# Patient Record
Sex: MALE | Race: WHITE | NOT HISPANIC OR LATINO | URBAN - METROPOLITAN AREA
[De-identification: names, ages, dates, MRNs, and addresses within clinical notes are randomized per-mention and may not be internally consistent; named-entity substitution may affect disease eponyms.]

---

## 2018-06-26 ENCOUNTER — OFFICE VISIT CONVERTED (OUTPATIENT)
Dept: UROLOGY | Facility: CLINIC | Age: 73
End: 2018-06-26
Attending: UROLOGY

## 2018-06-26 ENCOUNTER — CONVERSION ENCOUNTER (OUTPATIENT)
Dept: UROLOGY | Facility: CLINIC | Age: 73
End: 2018-06-26

## 2019-01-04 ENCOUNTER — OFFICE VISIT CONVERTED (OUTPATIENT)
Dept: UROLOGY | Facility: CLINIC | Age: 74
End: 2019-01-04
Attending: UROLOGY

## 2019-04-24 ENCOUNTER — HOSPITAL ENCOUNTER (OUTPATIENT)
Dept: PERIOP | Facility: HOSPITAL | Age: 74
Setting detail: HOSPITAL OUTPATIENT SURGERY
Discharge: HOME | End: 2019-04-24
Attending: SURGERY

## 2019-05-07 ENCOUNTER — OFFICE VISIT CONVERTED (OUTPATIENT)
Dept: SURGERY | Facility: CLINIC | Age: 74
End: 2019-05-07
Attending: SURGERY

## 2019-07-12 ENCOUNTER — CONVERSION ENCOUNTER (OUTPATIENT)
Dept: UROLOGY | Facility: CLINIC | Age: 74
End: 2019-07-12

## 2019-07-12 ENCOUNTER — OFFICE VISIT CONVERTED (OUTPATIENT)
Dept: UROLOGY | Facility: CLINIC | Age: 74
End: 2019-07-12
Attending: UROLOGY

## 2019-08-06 ENCOUNTER — HOSPITAL ENCOUNTER (OUTPATIENT)
Dept: SURGERY | Facility: HOSPITAL | Age: 74
Setting detail: HOSPITAL OUTPATIENT SURGERY
Discharge: HOME | End: 2019-08-06
Attending: UROLOGY

## 2020-02-14 ENCOUNTER — HOSPITAL ENCOUNTER (OUTPATIENT)
Dept: UROLOGY | Facility: CLINIC | Age: 75
Discharge: HOME | End: 2020-02-14
Attending: UROLOGY

## 2020-02-14 ENCOUNTER — OFFICE VISIT CONVERTED (OUTPATIENT)
Dept: UROLOGY | Facility: CLINIC | Age: 75
End: 2020-02-14
Attending: UROLOGY

## 2020-02-14 LAB — PSA SERPL-MCNC: 0.81 NG/ML (ref 0–4)

## 2021-03-03 ENCOUNTER — HOSPITAL ENCOUNTER (OUTPATIENT)
Dept: VACCINE CLINIC | Facility: HOSPITAL | Age: 76
Discharge: HOME | End: 2021-03-03
Attending: INTERNAL MEDICINE

## 2021-03-24 ENCOUNTER — HOSPITAL ENCOUNTER (OUTPATIENT)
Dept: VACCINE CLINIC | Facility: HOSPITAL | Age: 76
Discharge: HOME | End: 2021-03-24
Attending: INTERNAL MEDICINE

## 2021-05-15 VITALS
SYSTOLIC BLOOD PRESSURE: 167 MMHG | HEIGHT: 73 IN | DIASTOLIC BLOOD PRESSURE: 76 MMHG | BODY MASS INDEX: 23.86 KG/M2 | WEIGHT: 180 LBS | HEART RATE: 78 BPM | TEMPERATURE: 98.7 F

## 2021-05-15 VITALS — RESPIRATION RATE: 16 BRPM | WEIGHT: 188 LBS | BODY MASS INDEX: 24.92 KG/M2 | HEIGHT: 73 IN

## 2021-05-15 VITALS
TEMPERATURE: 98.9 F | DIASTOLIC BLOOD PRESSURE: 75 MMHG | HEART RATE: 77 BPM | SYSTOLIC BLOOD PRESSURE: 166 MMHG | HEIGHT: 73 IN | WEIGHT: 180 LBS | BODY MASS INDEX: 23.86 KG/M2

## 2021-05-16 VITALS
TEMPERATURE: 98.2 F | DIASTOLIC BLOOD PRESSURE: 79 MMHG | WEIGHT: 186 LBS | SYSTOLIC BLOOD PRESSURE: 180 MMHG | HEART RATE: 71 BPM | BODY MASS INDEX: 24.65 KG/M2 | HEIGHT: 73 IN

## 2021-05-16 VITALS
TEMPERATURE: 98.3 F | HEART RATE: 69 BPM | BODY MASS INDEX: 24.78 KG/M2 | WEIGHT: 187 LBS | SYSTOLIC BLOOD PRESSURE: 155 MMHG | HEIGHT: 73 IN | DIASTOLIC BLOOD PRESSURE: 74 MMHG

## 2025-02-25 ENCOUNTER — OFFICE VISIT (OUTPATIENT)
Dept: INTERNAL MEDICINE | Age: 80
End: 2025-02-25
Payer: MEDICARE

## 2025-02-25 VITALS
HEART RATE: 93 BPM | SYSTOLIC BLOOD PRESSURE: 186 MMHG | HEIGHT: 73 IN | WEIGHT: 178 LBS | DIASTOLIC BLOOD PRESSURE: 79 MMHG | TEMPERATURE: 98 F | BODY MASS INDEX: 23.59 KG/M2 | OXYGEN SATURATION: 97 %

## 2025-02-25 DIAGNOSIS — R09.89 BILATERAL CAROTID BRUITS: ICD-10-CM

## 2025-02-25 DIAGNOSIS — Z12.5 SCREENING PSA (PROSTATE SPECIFIC ANTIGEN): ICD-10-CM

## 2025-02-25 DIAGNOSIS — I10 HYPERTENSION, ESSENTIAL: Primary | ICD-10-CM

## 2025-02-25 DIAGNOSIS — Z00.00 ENCOUNTER FOR ANNUAL PHYSICAL EXAM: ICD-10-CM

## 2025-02-25 DIAGNOSIS — E55.9 VITAMIN D DEFICIENCY: ICD-10-CM

## 2025-02-25 DIAGNOSIS — C67.9 MALIGNANT NEOPLASM OF URINARY BLADDER, UNSPECIFIED SITE: ICD-10-CM

## 2025-02-25 DIAGNOSIS — E04.1 THYROID NODULE: ICD-10-CM

## 2025-02-25 PROCEDURE — 99214 OFFICE O/P EST MOD 30 MIN: CPT | Performed by: INTERNAL MEDICINE

## 2025-02-25 PROCEDURE — 3078F DIAST BP <80 MM HG: CPT | Performed by: INTERNAL MEDICINE

## 2025-02-25 PROCEDURE — 3077F SYST BP >= 140 MM HG: CPT | Performed by: INTERNAL MEDICINE

## 2025-02-25 RX ORDER — AMLODIPINE AND BENAZEPRIL HYDROCHLORIDE 5; 10 MG/1; MG/1
1 CAPSULE ORAL DAILY
Qty: 30 CAPSULE | Refills: 5 | Status: SHIPPED | OUTPATIENT
Start: 2025-02-25 | End: 2025-02-25

## 2025-02-25 RX ORDER — AMLODIPINE BESYLATE 5 MG/1
5 TABLET ORAL DAILY
Qty: 30 TABLET | Refills: 5 | Status: SHIPPED | OUTPATIENT
Start: 2025-02-25

## 2025-02-25 NOTE — PROGRESS NOTES
"Chief Complaint   Patient presents with    Establish Care     New pt establish care. Pt is non fasting, has some concerns about BP running high.     Concerned re bp elevation, home numbers noted     Objective   Vital Signs  Vitals:    02/25/25 0933   BP: (!) 186/79   BP Location: Right arm   Patient Position: Sitting   Pulse: 93   Temp: 98 °F (36.7 °C)   SpO2: 97%   Weight: 80.7 kg (178 lb)   Height: 185.4 cm (72.99\")      Body mass index is 23.49 kg/m².  Review of Systems   Constitutional: Negative.    HENT: Negative.     Eyes: Negative.    Respiratory: Negative.     Cardiovascular: Negative.    Gastrointestinal: Negative.    Endocrine: Negative.    Genitourinary: Negative.    Musculoskeletal: Negative.    Allergic/Immunologic: Negative.    Neurological: Negative.    Hematological: Negative.    Psychiatric/Behavioral: Negative.        Physical Exam  Constitutional:       General: He is not in acute distress.     Appearance: Normal appearance.   HENT:      Head: Normocephalic.      Mouth/Throat:      Mouth: Mucous membranes are moist.   Eyes:      Conjunctiva/sclera: Conjunctivae normal.      Pupils: Pupils are equal, round, and reactive to light.   Cardiovascular:      Rate and Rhythm: Normal rate and regular rhythm.      Pulses: Normal pulses.      Heart sounds: Normal heart sounds.   Pulmonary:      Effort: Pulmonary effort is normal.      Breath sounds: Normal breath sounds.   Abdominal:      General: Abdomen is flat. Bowel sounds are normal.      Palpations: Abdomen is soft.   Musculoskeletal:         General: No swelling. Normal range of motion.      Cervical back: Neck supple.   Skin:     General: Skin is warm and dry.      Coloration: Skin is not jaundiced.   Neurological:      General: No focal deficit present.      Mental Status: He is alert and oriented to person, place, and time. Mental status is at baseline.   Psychiatric:         Mood and Affect: Mood normal.         Behavior: Behavior normal.         " "Thought Content: Thought content normal.         Judgment: Judgment normal.     Bilateral carotid bruits  Right thyroid fullness on exam with swallowing,  Abnormal right tympanic membrane  Partial left cerumen impactions  Result Review :   No results found for: \"PROBNP\", \"BNP\"          No results found for: \"TSH\"   No results found for: \"FREET4\"                       Visit Diagnoses:    ICD-10-CM ICD-9-CM   1. Hypertension, essential  I10 401.9   2. Encounter for annual physical exam  Z00.00 V70.0   3. Screening PSA (prostate specific antigen)  Z12.5 V76.44   4. Vitamin D deficiency  E55.9 268.9   5. Malignant neoplasm of urinary bladder, unspecified site  C67.9 188.9   6. Bilateral carotid bruits  R09.89 785.9   7. Thyroid nodule  E04.1 241.0       Assessment and Plan   Diagnoses and all orders for this visit:    1. Hypertension, essential (Primary)  -     CBC & Differential; Future  -     Comprehensive Metabolic Panel; Future  -     Lipid Panel; Future  -     Vitamin D,25-Hydroxy; Future  -     Vitamin B12 anemia; Future  -     Folate anemia; Future  -     TSH+Free T4; Future  -     PSA Screen; Future    2. Encounter for annual physical exam  -     CBC & Differential; Future  -     Comprehensive Metabolic Panel; Future  -     Lipid Panel; Future  -     Vitamin D,25-Hydroxy; Future  -     Vitamin B12 anemia; Future  -     Folate anemia; Future  -     TSH+Free T4; Future  -     PSA Screen; Future    3. Screening PSA (prostate specific antigen)  -     CBC & Differential; Future  -     Comprehensive Metabolic Panel; Future  -     Lipid Panel; Future  -     Vitamin D,25-Hydroxy; Future  -     Vitamin B12 anemia; Future  -     Folate anemia; Future  -     TSH+Free T4; Future  -     PSA Screen; Future    4. Vitamin D deficiency  -     CBC & Differential; Future  -     Comprehensive Metabolic Panel; Future  -     Lipid Panel; Future  -     Vitamin D,25-Hydroxy; Future  -     Vitamin B12 anemia; Future  -     Folate " anemia; Future  -     TSH+Free T4; Future  -     PSA Screen; Future    5. Malignant neoplasm of urinary bladder, unspecified site  -     CBC & Differential; Future  -     Comprehensive Metabolic Panel; Future  -     Lipid Panel; Future  -     Vitamin D,25-Hydroxy; Future  -     Vitamin B12 anemia; Future  -     Folate anemia; Future  -     TSH+Free T4; Future  -     PSA Screen; Future    6. Bilateral carotid bruits  -     Duplex Carotid Ultrasound CAR; Future    7. Thyroid nodule  -     US Thyroid; Future    Other orders  -     Discontinue: amLODIPine-benazepril (Lotrel) 5-10 MG per capsule; Take 1 capsule by mouth Daily.  Dispense: 30 capsule; Refill: 5  -     amLODIPine (Norvasc) 5 MG tablet; Take 1 tablet by mouth Daily.  Dispense: 30 tablet; Refill: 5        BMI is within normal parameters. No other follow-up for BMI required.     Htn --rec rx with lotrel 5/10 mg qhs feb 2025, watch sodium, cont exercise     Carotid bruits bilateral we will do carotid ultrasound,, February 2025    Right thyroid enlargement versus nodule will do thyroid ultrasound February 2025    Remote tobacco, quit age 41    Bladder ca --dr santiago, cystos, 2008     Bph --    Laparoscopic hernia repair Dr. Martinez    Abnormal right tympanic membrane,?  Left partial cerumen    Follow Up   Return in about 4 weeks (around 3/25/2025).  Patient was given instructions and counseling regarding his condition or for health maintenance advice. Please see specific information pulled into the AVS if appropriate.

## 2025-02-28 ENCOUNTER — PATIENT ROUNDING (BHMG ONLY) (OUTPATIENT)
Dept: INTERNAL MEDICINE | Age: 80
End: 2025-02-28
Payer: MEDICARE

## 2025-02-28 ENCOUNTER — HOSPITAL ENCOUNTER (OUTPATIENT)
Dept: ULTRASOUND IMAGING | Facility: HOSPITAL | Age: 80
Discharge: HOME | End: 2025-02-28
Payer: MEDICARE

## 2025-02-28 DIAGNOSIS — E04.1 THYROID NODULE: ICD-10-CM

## 2025-02-28 PROCEDURE — 76536 US EXAM OF HEAD AND NECK: CPT

## 2025-03-05 ENCOUNTER — TELEPHONE (OUTPATIENT)
Dept: INTERNAL MEDICINE | Age: 80
End: 2025-03-05
Payer: MEDICARE

## 2025-03-05 NOTE — TELEPHONE ENCOUNTER
"Relay     \"there was no suspicious thyroid nodules and will discuss further at his next appointment \"                "

## 2025-03-05 NOTE — TELEPHONE ENCOUNTER
HUB may relay message to patient.     ----- Message from Benji Oliver sent at 3/4/2025  4:44 PM EST -----  Call patient, there was no suspicious thyroid nodules and will discuss further at his next appointment

## 2025-03-05 NOTE — TELEPHONE ENCOUNTER
"Name: Claudio Leigh \"Al\"    Relationship: Self    Best Callback Number: [phone]     HUB PROVIDED THE RELAY MESSAGE FROM THE OFFICE   PATIENT VOICED UNDERSTANDING AND HAS NO FURTHER QUESTIONS AT THIS TIME  "

## 2025-03-17 ENCOUNTER — LAB (OUTPATIENT)
Dept: INTERNAL MEDICINE | Age: 80
End: 2025-03-17
Payer: MEDICARE

## 2025-03-17 DIAGNOSIS — I10 HYPERTENSION, ESSENTIAL: ICD-10-CM

## 2025-03-17 DIAGNOSIS — E55.9 VITAMIN D DEFICIENCY: ICD-10-CM

## 2025-03-17 DIAGNOSIS — Z00.00 ENCOUNTER FOR ANNUAL PHYSICAL EXAM: ICD-10-CM

## 2025-03-17 DIAGNOSIS — Z12.5 SCREENING PSA (PROSTATE SPECIFIC ANTIGEN): ICD-10-CM

## 2025-03-17 DIAGNOSIS — C67.9 MALIGNANT NEOPLASM OF URINARY BLADDER, UNSPECIFIED SITE: ICD-10-CM

## 2025-03-17 LAB
25(OH)D3 SERPL-MCNC: 34 NG/ML (ref 30–100)
ALBUMIN SERPL-MCNC: 4.3 G/DL (ref 3.5–5.2)
ALBUMIN/GLOB SERPL: 1.2 G/DL
ALP SERPL-CCNC: 86 U/L (ref 39–117)
ALT SERPL W P-5'-P-CCNC: 17 U/L (ref 1–41)
ANION GAP SERPL CALCULATED.3IONS-SCNC: 10.8 MMOL/L (ref 5–15)
AST SERPL-CCNC: 26 U/L (ref 1–40)
BASOPHILS # BLD AUTO: 0.06 10*3/MM3 (ref 0–0.2)
BASOPHILS NFR BLD AUTO: 0.9 % (ref 0–1.5)
BILIRUB SERPL-MCNC: 0.5 MG/DL (ref 0–1.2)
BUN SERPL-MCNC: 14 MG/DL (ref 8–23)
BUN/CREAT SERPL: 11.6 (ref 7–25)
CALCIUM SPEC-SCNC: 10 MG/DL (ref 8.6–10.5)
CHLORIDE SERPL-SCNC: 105 MMOL/L (ref 98–107)
CHOLEST SERPL-MCNC: 199 MG/DL (ref 0–200)
CO2 SERPL-SCNC: 26.2 MMOL/L (ref 22–29)
CREAT SERPL-MCNC: 1.21 MG/DL (ref 0.76–1.27)
DEPRECATED RDW RBC AUTO: 45.1 FL (ref 37–54)
EGFRCR SERPLBLD CKD-EPI 2021: 61.3 ML/MIN/1.73
EOSINOPHIL # BLD AUTO: 0.34 10*3/MM3 (ref 0–0.4)
EOSINOPHIL NFR BLD AUTO: 5 % (ref 0.3–6.2)
ERYTHROCYTE [DISTWIDTH] IN BLOOD BY AUTOMATED COUNT: 12.8 % (ref 12.3–15.4)
FOLATE SERPL-MCNC: 16.4 NG/ML (ref 4.78–24.2)
GLOBULIN UR ELPH-MCNC: 3.5 GM/DL
GLUCOSE SERPL-MCNC: 107 MG/DL (ref 65–99)
HCT VFR BLD AUTO: 45.9 % (ref 37.5–51)
HDLC SERPL-MCNC: 47 MG/DL (ref 40–60)
HGB BLD-MCNC: 14.8 G/DL (ref 13–17.7)
IMM GRANULOCYTES # BLD AUTO: 0.01 10*3/MM3 (ref 0–0.05)
IMM GRANULOCYTES NFR BLD AUTO: 0.1 % (ref 0–0.5)
LDLC SERPL CALC-MCNC: 133 MG/DL (ref 0–100)
LDLC/HDLC SERPL: 2.77 {RATIO}
LYMPHOCYTES # BLD AUTO: 1.7 10*3/MM3 (ref 0.7–3.1)
LYMPHOCYTES NFR BLD AUTO: 25.1 % (ref 19.6–45.3)
MCH RBC QN AUTO: 30.6 PG (ref 26.6–33)
MCHC RBC AUTO-ENTMCNC: 32.2 G/DL (ref 31.5–35.7)
MCV RBC AUTO: 94.8 FL (ref 79–97)
MONOCYTES # BLD AUTO: 0.74 10*3/MM3 (ref 0.1–0.9)
MONOCYTES NFR BLD AUTO: 10.9 % (ref 5–12)
NEUTROPHILS NFR BLD AUTO: 3.93 10*3/MM3 (ref 1.7–7)
NEUTROPHILS NFR BLD AUTO: 58 % (ref 42.7–76)
NRBC BLD AUTO-RTO: 0 /100 WBC (ref 0–0.2)
PLATELET # BLD AUTO: 223 10*3/MM3 (ref 140–450)
PMV BLD AUTO: 10.6 FL (ref 6–12)
POTASSIUM SERPL-SCNC: 5.1 MMOL/L (ref 3.5–5.2)
PROT SERPL-MCNC: 7.8 G/DL (ref 6–8.5)
PSA SERPL-MCNC: 0.77 NG/ML (ref 0–4)
RBC # BLD AUTO: 4.84 10*6/MM3 (ref 4.14–5.8)
SODIUM SERPL-SCNC: 142 MMOL/L (ref 136–145)
T4 FREE SERPL-MCNC: 0.83 NG/DL (ref 0.92–1.68)
TRIGL SERPL-MCNC: 108 MG/DL (ref 0–150)
TSH SERPL DL<=0.05 MIU/L-ACNC: 5.92 UIU/ML (ref 0.27–4.2)
VIT B12 BLD-MCNC: >2000 PG/ML (ref 211–946)
VLDLC SERPL-MCNC: 19 MG/DL (ref 5–40)
WBC NRBC COR # BLD AUTO: 6.78 10*3/MM3 (ref 3.4–10.8)

## 2025-03-17 PROCEDURE — 80061 LIPID PANEL: CPT | Performed by: INTERNAL MEDICINE

## 2025-03-17 PROCEDURE — 84443 ASSAY THYROID STIM HORMONE: CPT | Performed by: INTERNAL MEDICINE

## 2025-03-17 PROCEDURE — 82607 VITAMIN B-12: CPT | Performed by: INTERNAL MEDICINE

## 2025-03-17 PROCEDURE — 85025 COMPLETE CBC W/AUTO DIFF WBC: CPT | Performed by: INTERNAL MEDICINE

## 2025-03-17 PROCEDURE — 82306 VITAMIN D 25 HYDROXY: CPT | Performed by: INTERNAL MEDICINE

## 2025-03-17 PROCEDURE — 80053 COMPREHEN METABOLIC PANEL: CPT | Performed by: INTERNAL MEDICINE

## 2025-03-17 PROCEDURE — 36415 COLL VENOUS BLD VENIPUNCTURE: CPT | Performed by: INTERNAL MEDICINE

## 2025-03-17 PROCEDURE — 82746 ASSAY OF FOLIC ACID SERUM: CPT | Performed by: INTERNAL MEDICINE

## 2025-03-17 PROCEDURE — 84439 ASSAY OF FREE THYROXINE: CPT | Performed by: INTERNAL MEDICINE

## 2025-03-17 PROCEDURE — G0103 PSA SCREENING: HCPCS | Performed by: INTERNAL MEDICINE

## 2025-03-20 ENCOUNTER — OFFICE VISIT (OUTPATIENT)
Dept: INTERNAL MEDICINE | Age: 80
End: 2025-03-20
Payer: MEDICARE

## 2025-03-20 VITALS
OXYGEN SATURATION: 98 % | WEIGHT: 178 LBS | BODY MASS INDEX: 23.59 KG/M2 | SYSTOLIC BLOOD PRESSURE: 155 MMHG | DIASTOLIC BLOOD PRESSURE: 80 MMHG | TEMPERATURE: 98.2 F | HEART RATE: 71 BPM | HEIGHT: 73 IN

## 2025-03-20 DIAGNOSIS — Z12.5 SCREENING PSA (PROSTATE SPECIFIC ANTIGEN): ICD-10-CM

## 2025-03-20 DIAGNOSIS — E55.9 VITAMIN D DEFICIENCY: ICD-10-CM

## 2025-03-20 DIAGNOSIS — Z00.00 MEDICARE ANNUAL WELLNESS VISIT, SUBSEQUENT: Primary | ICD-10-CM

## 2025-03-20 DIAGNOSIS — E04.1 THYROID NODULE: ICD-10-CM

## 2025-03-20 DIAGNOSIS — R09.89 BILATERAL CAROTID BRUITS: ICD-10-CM

## 2025-03-20 DIAGNOSIS — R79.89 ELEVATED TSH: ICD-10-CM

## 2025-03-20 DIAGNOSIS — C67.9 MALIGNANT NEOPLASM OF URINARY BLADDER, UNSPECIFIED SITE: ICD-10-CM

## 2025-03-20 DIAGNOSIS — I10 HYPERTENSION, ESSENTIAL: ICD-10-CM

## 2025-03-20 NOTE — PROGRESS NOTES
Subjective   The ABCs of the Annual Wellness Visit  Medicare Wellness Visit      Claudio Leigh is a 79 y.o. patient who presents for a Medicare Wellness Visit.    The following portions of the patient's history were reviewed and   updated as appropriate: allergies, current medications, past family history, past medical history, past social history, past surgical history, and problem list.    Compared to one year ago, the patient's physical   health is better.  Compared to one year ago, the patient's mental   health is better.    Recent Hospitalizations:  He was not admitted to the hospital during the last year.     Current Medical Providers:  Patient Care Team:  Benji Oliver MD as PCP - General (Internal Medicine)    Outpatient Medications Prior to Visit   Medication Sig Dispense Refill    amLODIPine (Norvasc) 5 MG tablet Take 1 tablet by mouth Daily. 30 tablet 5    aspirin 81 MG EC tablet aspirin 81 mg oral tablet,delayed release (DR/EC) take 1 tablet (81 mg) by oral route once daily   Active      multivitamin with minerals (MULTIVITAMIN ADULT PO) multivitamin oral tablet take 1 tablet by oral route daily   Suspended       No facility-administered medications prior to visit.     No opioid medication identified on active medication list. I have reviewed chart for other potential  high risk medication/s and harmful drug interactions in the elderly.      Aspirin is on active medication list. Aspirin use is indicated based on review of current medical condition/s. Pros and cons of this therapy have been discussed today. Benefits of this medication outweigh potential harm.  Patient has been encouraged to continue taking this medication.  .      Patient Active Problem List   Diagnosis    Hypertension, essential    Encounter for annual physical exam    Vitamin D deficiency    Screening PSA (prostate specific antigen)    Malignant neoplasm of urinary bladder    Thyroid nodule    Bilateral carotid bruits  "    Advance Care Planning Advance Directive is on file.  ACP discussion was held with the patient during this visit. Patient has an advance directive in EMR which is still valid.             Objective   Vitals:    25 1309   BP: 155/80   BP Location: Left arm   Patient Position: Sitting   Pulse: 71   Temp: 98.2 °F (36.8 °C)   SpO2: 98%   Weight: 80.7 kg (178 lb)   Height: 185.4 cm (72.99\")   PainSc: 0-No pain       Estimated body mass index is 23.49 kg/m² as calculated from the following:    Height as of this encounter: 185.4 cm (72.99\").    Weight as of this encounter: 80.7 kg (178 lb).    BMI is within normal parameters. No other follow-up for BMI required.           Does the patient have evidence of cognitive impairment? No  Lab Results   Component Value Date    TRIG 108 2025    HDL 47 2025     (H) 2025    VLDL 19 2025                                                                                               Health  Risk Assessment    Smoking Status:  Social History     Tobacco Use   Smoking Status Former    Current packs/day: 0.00    Average packs/day: 1.5 packs/day for 25.0 years (37.5 ttl pk-yrs)    Types: Cigarettes    Start date:     Quit date:     Years since quittin.2   Smokeless Tobacco Never     Alcohol Consumption:  Social History     Substance and Sexual Activity   Alcohol Use Never       Fall Risk Screen  STEADI Fall Risk Assessment was completed, and patient is at LOW risk for falls.Assessment completed on:3/20/2025    Depression Screening   Little interest or pleasure in doing things? Not at all   Feeling down, depressed, or hopeless? Not at all   PHQ-2 Total Score 0      Health Habits and Functional and Cognitive Screening:      3/20/2025     1:00 PM   Functional & Cognitive Status   Do you have difficulty preparing food and eating? No   Do you have difficulty bathing yourself, getting dressed or grooming yourself? No   Do you have difficulty " using the toilet? No   Do you have difficulty moving around from place to place? No   Do you have trouble with steps or getting out of a bed or a chair? No   Current Diet Well Balanced Diet   Dental Exam Up to date   Eye Exam Up to date   Exercise (times per week) 3 times per week   Current Exercises Include Home Exercise Program (TV, Computer, Etc.);Walking   Do you need help using the phone?  No   Are you deaf or do you have serious difficulty hearing?  No   Do you need help to go to places out of walking distance? No   Do you need help shopping? No   Do you need help preparing meals?  No   Do you need help with housework?  No   Do you need help with laundry? No   Do you need help taking your medications? No   Do you need help managing money? No   Do you ever drive or ride in a car without wearing a seat belt? No   Have you felt unusual stress, anger or loneliness in the last month? No   Who do you live with? Spouse   If you need help, do you have trouble finding someone available to you? No   Have you been bothered in the last four weeks by sexual problems? No   Do you have difficulty concentrating, remembering or making decisions? No           Age-appropriate Screening Schedule:  Refer to the list below for future screening recommendations based on patient's age, sex and/or medical conditions. Orders for these recommended tests are listed in the plan section. The patient has been provided with a written plan.    Health Maintenance List  Health Maintenance   Topic Date Due    TDAP/TD VACCINES (1 - Tdap) Never done    Pneumococcal Vaccine 50+ (1 of 1 - PCV) Never done    ZOSTER VACCINE (1 of 2) Never done    RSV Vaccine - Adults (1 - 1-dose 75+ series) Never done    INFLUENZA VACCINE  Never done    HEPATITIS C SCREENING  Never done    ANNUAL WELLNESS VISIT  Never done    COVID-19 Vaccine (8 - 2024-25 season) 07/27/2025                                                                                                                                                 CMS Preventative Services Quick Reference  Risk Factors Identified During Encounter  None Identified    The above risks/problems have been discussed with the patient.  Pertinent information has been shared with the patient in the After Visit Summary.  An After Visit Summary and PPPS were made available to the patient.    Follow Up:   Next Medicare Wellness visit to be scheduled in 1 year.     Assessment & Plan         Follow Up:   No follow-ups on file.        Answers submitted by the patient for this visit:  Questionnaire about: Other medical problem (Submitted on 3/20/2025)  Chief Complaint: Other medical problem

## 2025-03-20 NOTE — PROGRESS NOTES
"Chief Complaint   Patient presents with    Medicare Wellness-subsequent     Wellness visit, lab follow up. HTN /BP log follow up. Pt would like to discuss PSA  and why he has a deficiency in Vitamin D.         Objective   Vital Signs  Vitals:    03/20/25 1309   BP: 155/80   BP Location: Left arm   Patient Position: Sitting   Pulse: 71   Temp: 98.2 °F (36.8 °C)   SpO2: 98%   Weight: 80.7 kg (178 lb)   Height: 185.4 cm (72.99\")      Body mass index is 23.49 kg/m².  Review of Systems   Constitutional: Negative.    HENT: Negative.     Eyes: Negative.    Respiratory: Negative.     Cardiovascular: Negative.    Gastrointestinal: Negative.    Endocrine: Negative.    Genitourinary: Negative.    Musculoskeletal: Negative.    Allergic/Immunologic: Negative.    Neurological: Negative.    Hematological: Negative.    Psychiatric/Behavioral: Negative.        Physical Exam  Constitutional:       General: He is not in acute distress.     Appearance: Normal appearance.   HENT:      Head: Normocephalic.      Mouth/Throat:      Mouth: Mucous membranes are moist.   Eyes:      Conjunctiva/sclera: Conjunctivae normal.      Pupils: Pupils are equal, round, and reactive to light.   Cardiovascular:      Rate and Rhythm: Normal rate and regular rhythm.      Pulses: Normal pulses.      Heart sounds: Normal heart sounds.   Pulmonary:      Effort: Pulmonary effort is normal.      Breath sounds: Normal breath sounds.   Abdominal:      General: Abdomen is flat. Bowel sounds are normal.      Palpations: Abdomen is soft.   Musculoskeletal:         General: No swelling. Normal range of motion.      Cervical back: Neck supple.   Skin:     General: Skin is warm and dry.      Coloration: Skin is not jaundiced.   Neurological:      General: No focal deficit present.      Mental Status: He is alert and oriented to person, place, and time. Mental status is at baseline.   Psychiatric:         Mood and Affect: Mood normal.         Behavior: Behavior normal.  " "       Thought Content: Thought content normal.         Judgment: Judgment normal.     Left carotid bruit  Result Review :   No results found for: \"PROBNP\", \"BNP\"  CMP          3/17/2025    09:52   CMP   Glucose 107    BUN 14    Creatinine 1.21    EGFR 61.3    Sodium 142    Potassium 5.1    Chloride 105    Calcium 10.0    Total Protein 7.8    Albumin 4.3    Globulin 3.5    Total Bilirubin 0.5    Alkaline Phosphatase 86    AST (SGOT) 26    ALT (SGPT) 17    Albumin/Globulin Ratio 1.2    BUN/Creatinine Ratio 11.6    Anion Gap 10.8      CBC w/diff          3/17/2025    09:52   CBC w/Diff   WBC 6.78    RBC 4.84    Hemoglobin 14.8    Hematocrit 45.9    MCV 94.8    MCH 30.6    MCHC 32.2    RDW 12.8    Platelets 223    Neutrophil Rel % 58.0    Immature Granulocyte Rel % 0.1    Lymphocyte Rel % 25.1    Monocyte Rel % 10.9    Eosinophil Rel % 5.0    Basophil Rel % 0.9       Lipid Panel          3/17/2025    09:52   Lipid Panel   Total Cholesterol 199    Triglycerides 108    HDL Cholesterol 47    VLDL Cholesterol 19    LDL Cholesterol  133    LDL/HDL Ratio 2.77       Lab Results   Component Value Date    TSH 5.920 (H) 03/17/2025      Lab Results   Component Value Date    FREET4 0.83 (L) 03/17/2025         PSA          3/17/2025    09:52   PSA   PSA 0.765                     Visit Diagnoses:    ICD-10-CM ICD-9-CM   1. Medicare annual wellness visit, subsequent  Z00.00 V70.0   2. Malignant neoplasm of urinary bladder, unspecified site  C67.9 188.9   3. Vitamin D deficiency  E55.9 268.9   4. Bilateral carotid bruits  R09.89 785.9   5. Hypertension, essential  I10 401.9   6. Thyroid nodule  E04.1 241.0   7. Screening PSA (prostate specific antigen)  Z12.5 V76.44   8. Elevated TSH  R79.89 794.5       Assessment and Plan   Diagnoses and all orders for this visit:    1. Medicare annual wellness visit, subsequent (Primary)  -     Comprehensive Metabolic Panel; Future  -     TSH+Free T4; Future  -     RPR Qualitative with Reflex to " Quant; Future    2. Malignant neoplasm of urinary bladder, unspecified site  -     Comprehensive Metabolic Panel; Future  -     TSH+Free T4; Future  -     RPR Qualitative with Reflex to Quant; Future    3. Vitamin D deficiency  -     Comprehensive Metabolic Panel; Future  -     TSH+Free T4; Future  -     RPR Qualitative with Reflex to Quant; Future    4. Bilateral carotid bruits  -     Comprehensive Metabolic Panel; Future  -     TSH+Free T4; Future  -     RPR Qualitative with Reflex to Quant; Future    5. Hypertension, essential  -     Comprehensive Metabolic Panel; Future  -     TSH+Free T4; Future  -     RPR Qualitative with Reflex to Quant; Future    6. Thyroid nodule  -     Comprehensive Metabolic Panel; Future  -     TSH+Free T4; Future  -     RPR Qualitative with Reflex to Quant; Future    7. Screening PSA (prostate specific antigen)  -     Comprehensive Metabolic Panel; Future  -     TSH+Free T4; Future  -     RPR Qualitative with Reflex to Quant; Future    8. Elevated TSH        Htn --continues amlodipine 5 mg nightly, home blood pressures reviewed most of them are in the 1 12-1 30 systolic range, occasionally high at dentist and doctors offices,    Carotid bruits bilateral ===carotid ultrasound,, February 2025--- schedule for a carotid ultrasound May 5, 2025 discussed briefly about statin usage carotid artery plaque buildup etc.,    Right thyroid enlargement versus nodule will do thyroid ultrasound February 2025--- results noted,===== no special thyroid nodules,  thyroid parenchyma mildly heterogeneous -----currently thyroid function tests atherosclerosis left carotid artery noted, discussed March 20, 2025,    Remote tobacco, quit age 41    Abnormal screening test for syphilis on a [place] blood donation screening test, will do VDRL RPR testing at next visit, September October November 2025,    Elevated TSH low free T4 diagnosis discussed with patient, subclinical hypothyroidism treatment options  discussed March 2025    Bladder ca --dr santiago, cystos, 2008     Bph --, PSA 0.7 stable March 2025    Laparoscopic hernia repair Dr. Martinez    Abnormal right tympanic membrane,?  Left partial cerumen        Follow Up   Return in about 6 months (around 9/20/2025).  Patient was given instructions and counseling regarding his condition or for health maintenance advice. Please see specific information pulled into the AVS if appropriate.           Answers submitted by the patient for this visit:  Questionnaire about: Other medical problem (Submitted on 3/20/2025)  Chief Complaint: Other medical problem

## 2025-05-05 ENCOUNTER — HOSPITAL ENCOUNTER (OUTPATIENT)
Dept: CARDIOLOGY | Facility: HOSPITAL | Age: 80
Discharge: HOME | End: 2025-05-05
Admitting: INTERNAL MEDICINE
Payer: MEDICARE

## 2025-05-05 DIAGNOSIS — R09.89 BILATERAL CAROTID BRUITS: ICD-10-CM

## 2025-05-05 LAB
BH CV XLRA MEAS LEFT CAROTID BULB EDV: 13.7 CM/SEC
BH CV XLRA MEAS LEFT CAROTID BULB PSV: 53.7 CM/SEC
BH CV XLRA MEAS LEFT DIST CCA EDV: 14.9 CM/SEC
BH CV XLRA MEAS LEFT DIST CCA PSV: 74.9 CM/SEC
BH CV XLRA MEAS LEFT DIST ICA EDV: 26.1 CM/SEC
BH CV XLRA MEAS LEFT DIST ICA PSV: 72 CM/SEC
BH CV XLRA MEAS LEFT ICA/CCA RATIO: 1.06
BH CV XLRA MEAS LEFT MID ICA EDV: 24.8 CM/SEC
BH CV XLRA MEAS LEFT MID ICA PSV: 79.5 CM/SEC
BH CV XLRA MEAS LEFT PROX CCA EDV: 16.6 CM/SEC
BH CV XLRA MEAS LEFT PROX CCA PSV: 77.8 CM/SEC
BH CV XLRA MEAS LEFT PROX ECA EDV: 13.7 CM/SEC
BH CV XLRA MEAS LEFT PROX ECA PSV: 126 CM/SEC
BH CV XLRA MEAS LEFT PROX ICA EDV: 23 CM/SEC
BH CV XLRA MEAS LEFT PROX ICA PSV: 64.6 CM/SEC
BH CV XLRA MEAS LEFT VERTEBRAL A EDV: 7.9 CM/SEC
BH CV XLRA MEAS LEFT VERTEBRAL A PSV: 27.5 CM/SEC
BH CV XLRA MEAS RIGHT CAROTID BULB EDV: 9.7 CM/SEC
BH CV XLRA MEAS RIGHT CAROTID BULB PSV: 52.9 CM/SEC
BH CV XLRA MEAS RIGHT DIST CCA EDV: 9.2 CM/SEC
BH CV XLRA MEAS RIGHT DIST CCA PSV: 59.5 CM/SEC
BH CV XLRA MEAS RIGHT DIST ICA EDV: 22.1 CM/SEC
BH CV XLRA MEAS RIGHT DIST ICA PSV: 77.5 CM/SEC
BH CV XLRA MEAS RIGHT ICA/CCA RATIO: 1.3
BH CV XLRA MEAS RIGHT MID ICA EDV: 18.5 CM/SEC
BH CV XLRA MEAS RIGHT MID ICA PSV: 72.1 CM/SEC
BH CV XLRA MEAS RIGHT PROX CCA EDV: 12.7 CM/SEC
BH CV XLRA MEAS RIGHT PROX CCA PSV: 57.8 CM/SEC
BH CV XLRA MEAS RIGHT PROX ECA EDV: 11 CM/SEC
BH CV XLRA MEAS RIGHT PROX ECA PSV: 114.9 CM/SEC
BH CV XLRA MEAS RIGHT PROX ICA EDV: 16.2 CM/SEC
BH CV XLRA MEAS RIGHT PROX ICA PSV: 71.7 CM/SEC
BH CV XLRA MEAS RIGHT VERTEBRAL A EDV: 12.2 CM/SEC
BH CV XLRA MEAS RIGHT VERTEBRAL A PSV: 46.2 CM/SEC

## 2025-05-05 PROCEDURE — 93880 EXTRACRANIAL BILAT STUDY: CPT

## 2025-05-05 PROCEDURE — 93880 EXTRACRANIAL BILAT STUDY: CPT | Performed by: SURGERY

## 2025-05-27 RX ORDER — AMLODIPINE BESYLATE 5 MG/1
5 TABLET ORAL DAILY
Qty: 30 TABLET | Refills: 5 | Status: SHIPPED | OUTPATIENT
Start: 2025-05-27